# Patient Record
Sex: FEMALE | Race: BLACK OR AFRICAN AMERICAN | Employment: OTHER | URBAN - METROPOLITAN AREA
[De-identification: names, ages, dates, MRNs, and addresses within clinical notes are randomized per-mention and may not be internally consistent; named-entity substitution may affect disease eponyms.]

---

## 2017-08-25 ENCOUNTER — HOSPITAL ENCOUNTER (OUTPATIENT)
Age: 82
Setting detail: OUTPATIENT SURGERY
Discharge: HOME | End: 2017-08-25
Attending: INTERNAL MEDICINE | Admitting: INTERNAL MEDICINE
Payer: MEDICARE

## 2017-08-25 ENCOUNTER — ANESTHESIA (OUTPATIENT)
Dept: ENDOSCOPY | Age: 82
End: 2017-08-25
Payer: MEDICARE

## 2017-08-25 ENCOUNTER — ANESTHESIA EVENT (OUTPATIENT)
Dept: ENDOSCOPY | Age: 82
End: 2017-08-25
Payer: MEDICARE

## 2017-08-25 VITALS
RESPIRATION RATE: 18 BRPM | HEART RATE: 57 BPM | HEIGHT: 62 IN | OXYGEN SATURATION: 99 % | SYSTOLIC BLOOD PRESSURE: 137 MMHG | TEMPERATURE: 98.6 F | WEIGHT: 130.07 LBS | BODY MASS INDEX: 23.94 KG/M2 | DIASTOLIC BLOOD PRESSURE: 66 MMHG

## 2017-08-25 PROCEDURE — 76060000031 HC ANESTHESIA FIRST 0.5 HR: Performed by: INTERNAL MEDICINE

## 2017-08-25 PROCEDURE — 74011250636 HC RX REV CODE- 250/636

## 2017-08-25 PROCEDURE — 74011000250 HC RX REV CODE- 250

## 2017-08-25 PROCEDURE — 74011250636 HC RX REV CODE- 250/636: Performed by: INTERNAL MEDICINE

## 2017-08-25 PROCEDURE — 76040000025: Performed by: INTERNAL MEDICINE

## 2017-08-25 RX ORDER — SODIUM CHLORIDE 0.9 % (FLUSH) 0.9 %
5-10 SYRINGE (ML) INJECTION AS NEEDED
Status: CANCELLED | OUTPATIENT
Start: 2017-08-25

## 2017-08-25 RX ORDER — LIDOCAINE HYDROCHLORIDE 20 MG/ML
INJECTION, SOLUTION EPIDURAL; INFILTRATION; INTRACAUDAL; PERINEURAL AS NEEDED
Status: DISCONTINUED | OUTPATIENT
Start: 2017-08-25 | End: 2017-08-25 | Stop reason: HOSPADM

## 2017-08-25 RX ORDER — SODIUM CHLORIDE 9 MG/ML
1000 INJECTION, SOLUTION INTRAVENOUS CONTINUOUS
Status: DISCONTINUED | OUTPATIENT
Start: 2017-08-25 | End: 2017-08-25 | Stop reason: HOSPADM

## 2017-08-25 RX ORDER — PROPOFOL 10 MG/ML
INJECTION, EMULSION INTRAVENOUS AS NEEDED
Status: DISCONTINUED | OUTPATIENT
Start: 2017-08-25 | End: 2017-08-25 | Stop reason: HOSPADM

## 2017-08-25 RX ORDER — SODIUM CHLORIDE, SODIUM LACTATE, POTASSIUM CHLORIDE, CALCIUM CHLORIDE 600; 310; 30; 20 MG/100ML; MG/100ML; MG/100ML; MG/100ML
100 INJECTION, SOLUTION INTRAVENOUS CONTINUOUS
Status: DISCONTINUED | OUTPATIENT
Start: 2017-08-25 | End: 2017-08-25 | Stop reason: HOSPADM

## 2017-08-25 RX ORDER — SODIUM CHLORIDE, SODIUM LACTATE, POTASSIUM CHLORIDE, CALCIUM CHLORIDE 600; 310; 30; 20 MG/100ML; MG/100ML; MG/100ML; MG/100ML
100 INJECTION, SOLUTION INTRAVENOUS CONTINUOUS
Status: CANCELLED | OUTPATIENT
Start: 2017-08-25

## 2017-08-25 RX ADMIN — PROPOFOL 40 MG: 10 INJECTION, EMULSION INTRAVENOUS at 11:06

## 2017-08-25 RX ADMIN — PROPOFOL 20 MG: 10 INJECTION, EMULSION INTRAVENOUS at 11:09

## 2017-08-25 RX ADMIN — LIDOCAINE HYDROCHLORIDE 30 MG: 20 INJECTION, SOLUTION EPIDURAL; INFILTRATION; INTRACAUDAL; PERINEURAL at 11:06

## 2017-08-25 RX ADMIN — PROPOFOL 20 MG: 10 INJECTION, EMULSION INTRAVENOUS at 11:08

## 2017-08-25 RX ADMIN — SODIUM CHLORIDE 1000 ML: 900 INJECTION, SOLUTION INTRAVENOUS at 10:11

## 2017-08-25 RX ADMIN — PROPOFOL 20 MG: 10 INJECTION, EMULSION INTRAVENOUS at 11:13

## 2017-08-25 NOTE — ANESTHESIA POSTPROCEDURE EVALUATION
Post-Anesthesia Evaluation and Assessment    Patient: Brandon Herring MRN: [id number]  SSN: [id number]    YOB: 1929  Age: 80 y.o. Sex: female       Cardiovascular Function/Vital Signs  Visit Vitals    /66    Pulse (!) 57    Temp 37 °C (98.6 °F)    Resp 18    Ht 5' 2\" (1.575 m)    Wt 59 kg (130 lb 1.1 oz)    SpO2 99%    BMI 23.79 kg/m2       Patient is status post total IV anesthesia anesthesia for Procedure(s):  ESOPHAGOGASTRODUODENOSCOPY (EGD)  BMI 24. Nausea/Vomiting: None    Postoperative hydration reviewed and adequate. Pain:  Pain Scale 1: Numeric (0 - 10) (08/25/17 1151)  Pain Intensity 1: 0 (08/25/17 1151)   Managed    Neurological Status: At baseline    Mental Status and Level of Consciousness: Awake and at baseline    Pulmonary Status:   O2 Device: Room air (08/25/17 1151)   Adequate oxygenation and airway patent    Complications related to anesthesia: None    Post-anesthesia assessment completed.  No concerns    Signed By: Brian Mcdowell MD     August 25, 2017

## 2017-08-25 NOTE — ROUTINE PROCESS
Pt. Discharged to car by Malissa Soto with family . Vital signs stable. Able to tolerate PO fluids. Passing gas.  Seen by MD.

## 2017-08-25 NOTE — ANESTHESIA PREPROCEDURE EVALUATION
Anesthetic History   No history of anesthetic complications            Review of Systems / Medical History  Patient summary reviewed and pertinent labs reviewed    Pulmonary  Within defined limits                 Neuro/Psych   Within defined limits           Cardiovascular                  Exercise tolerance: <4 METS     GI/Hepatic/Renal     GERD    Renal disease: ESRD and dialysis  Hiatal hernia     Endo/Other        Anemia     Other Findings              Physical Exam    Airway  Mallampati: I  TM Distance: 4 - 6 cm  Neck ROM: normal range of motion   Mouth opening: Normal     Cardiovascular    Rhythm: regular  Rate: normal    Murmur: Grade 2, Mitral area     Dental    Dentition: Lower partial plate and Full upper dentures     Pulmonary  Breath sounds clear to auscultation               Abdominal         Other Findings            Anesthetic Plan    ASA: 3  Anesthesia type: total IV anesthesia          Induction: Intravenous  Anesthetic plan and risks discussed with: Patient and Son / Daughter

## 2017-08-25 NOTE — BRIEF OP NOTE
BRIEF OPERATIVE NOTE    Date of Procedure: 8/25/2017   Preoperative Diagnosis: Melena [K92.1]  AVM (arteriovenous malformation) of small bowel, acquired ([facility] [address] [K55.8]  Postoperative Diagnosis: Normal EGD     Procedure(s):  ENTEROSCOPY  BMI 24  Surgeon(s) and Role:     * Roxy Street MD - Primary         Assistant Staff:       Surgical Staff:  Endoscopy RN-1: Faye Ryder RN  Respiratory Therapist: Esha Rutledge RT  No case tracking events are documented in the log.   Anesthesia: MAC   Estimated Blood Loss: NONE  Specimens: * No specimens in log *   Findings: NORMAL ENTEROSCOPY TO PROXIMAL JEJUNUM   Complications: no immediate  Implants: * No implants in log *

## 2017-08-25 NOTE — IP AVS SNAPSHOT
[address] 
[phone] Patient: Nikia Alfaro MRN: [id number] YMY:[birth year] You are allergic to the following Allergen Reactions Aspirin Other (comments) Pt denies Recent Documentation Height Weight BMI OB Status Smoking Status 1.575 m 59 kg 23.79 kg/m2 Hysterectomy Current Every Day Smoker Emergency Contacts Name Discharge Info Relation Home Work Mobile Karyle Rakers  Other Relative [6] [phone]  Other Relative [6] [phone] About your hospitalization You were admitted on:  August 25, 2017 You last received care in the:  [facility] You were discharged on:  August 25, 2017 Unit phone number:  [phone] Why you were hospitalized Your primary diagnosis was:  Not on File Providers Seen During Your Hospitalizations Provider Role Specialty Primary office phone Orlando Wall MD Attending Provider Gastroenterology [phone] Your Primary Care Physician (PCP) Primary Care Physician Office Phone Office Fax Gerardo Harriet [phone] Follow-up Information None Current Discharge Medication List  
  
ASK your doctor about these medications Dose & Instructions Dispensing Information Comments Morning Noon Evening Bedtime DIALYVITE 800 0.8 mg Tab Generic drug:  b complex-c-zn-folic acid Your last dose was: Your next dose is:    
   
   
 Dose:  800 mg Take 800 mg by mouth every morning. Refills:  0  
     
   
   
   
  
 doxepin 10 mg capsule Commonly known as:  SINEquan Your last dose was: Your next dose is: Take  by mouth nightly. Refills:  0  
     
   
   
   
  
 pantoprazole 40 mg tablet Commonly known as:  PROTONIX Your last dose was: Your next dose is:    
   
   
 Dose:  40 mg Take 1 Tab by mouth two (2) times a day. Quantity:  60 Tab Refills:  5  
     
   
   
   
  
 pravastatin 80 mg tablet Commonly known as:  PRAVACHOL Your last dose was: Your next dose is:    
   
   
 Dose:  80 mg Take 80 mg by mouth nightly. Indications: HYPERCHOLESTEROLEMIA Refills:  0  
     
   
   
   
  
 traMADol 50 mg tablet Commonly known as:  ULTRAM  
   
Your last dose was: Your next dose is:    
   
   
 Dose:  50 mg Take 50 mg by mouth every eight (8) hours as needed for Pain. Refills:  0 Discharge Instructions Gastrointestinal Esophagogastroduodenoscopy (EGD) - Upper Exam Discharge Instructions 1. Call Dr. Yoli Dumont at [phone] for any problems or questions. 2. Contact the doctor's office for follow up appointment as directed. 3. Medication may cause drowsiness for several hours, therefore, do not drive or operate machinery for remainder of the day. 4. No alcohol today. 5. Ordinarily, you may resume regular diet and activity after exam unless otherwise specified by your physician. 6. For mild soreness in your throat you may use Cepacol throat lozenges or warm salt-water gargles as needed. Any additional instructions: Follow up as needed Discharge Orders None ACO Transitions of Care Introducing [employer] offers a voluntary care coordination program to provide high quality service and care to [facility] fee-for-service beneficiaries. Cassidy Ying was designed to help you enhance your health and well-being through the following services: ? Transitions of Care  support for individuals who are transitioning from one care setting to another (example: Hospital to home). ?  Chronic and Complex Care Coordination  support for individuals and caregivers of those with serious or chronic illnesses or with more than one chronic (ongoing) condition and those who take a number of different medications. If you meet specific medical criteria, a [facility] [address] may call you directly to coordinate your care with your primary care physician and your other care providers. For questions about the [facility]) programs, please, contact your physicians office. For general questions or additional information about Accountable Care Organizations: 
Please visit www.medicare.gov/acos. html or call 1-800-MEDICARE (8-820.827.9063) TTY users should call 3-812.970.7119. Introducing [facility]! Alea Glover introduces [facility] patient portal. Now you can access parts of your medical record, email your doctor's office, and request medication refills online. 1. In your internet browser, go to [email or web address] 2. Click on the First Time User? Click Here link in the Sign In box. You will see the New Member Sign Up page. 3. Enter your [employer] Access Code exactly as it appears below. You will not need to use this code after youve completed the sign-up process. If you do not sign up before the expiration date, you must request a new code. · [employer] Access Code: [facility] Expires: 11/15/2017 10:02 AM 
 
4. Enter the last four digits of your Social Security Number (xxxx) and Date of Birth (mm/dd/yyyy) as indicated and click Submit. You will be taken to the next sign-up page. 5. Create a [employer] ID. This will be your [facility] login ID and cannot be changed, so think of one that is secure and easy to remember. 6. Create a [employer] password. You can change your password at any time. 7. Enter your Password Reset Question and Answer. This can be used at a later time if you forget your password. 8. Enter your e-mail address.  You will receive e-mail notification when new information is available in [employer]. 9. Click Sign Up. You can now view and download portions of your medical record. 10. Click the Download Summary menu link to download a portable copy of your medical information. If you have questions, please visit the Frequently Asked Questions section of the [employer] website. Remember, [employer] is NOT to be used for urgent needs. For medical emergencies, dial 911. Now available from your iPhone and Android! General Information Please provide this summary of care documentation to your next provider. Patient Signature:  ____________________________________________________________ Date:  ____________________________________________________________  
  
Rafael Jose Juan Provider Signature:  ____________________________________________________________ Date:  ____________________________________________________________

## 2017-08-25 NOTE — PROCEDURES
[address]   PROCEDURE NOTE       Name:  Yudelka Aceves   MR#:  [id number]   :  1929   Account #:  [de-identified]   Date of Adm:  2017       DATE OF PROCEDURE: 2017     REFERRING: Wolf Carlos. PROCEDURE: Enteroscopy. INDICATIONS: Gastrointestinal bleed, history of arteriovenous   malformations. POSTOPERATIVE DIAGNOSIS: Normal examination to the proximal   jejunum. ANESTHESIA: MAC.      OTHER PROCEDURES: None. SCOPE: PCF-H190L. PROCEDURE NOTE: After informed consent was obtained, the patient   was placed in the left lateral decubitus position in the   endoscopy suite. Conscious sedation was obtained utilizing   monitored anesthesia. Once adequate analgesia was obtained,   digital rectal exam is performed. The Olympus PCF-H190L video   chip pediatric adjustable colonoscope was advanced through the   oropharynx into the esophagus, stomach and duodenum. The   endoscope was advanced to the area of the ligament of Treitz and   using push enteroscopy technique, was advanced into the proximal   jejunum to full insertion of the endoscope. The endoscope was   withdrawn with careful attention to mucosal detail. I appreciate   no active bleeding in the small bowel. I appreciate no   arteriovenous malformations in the examined jejunum and duodenum   or stomach, including retroflexed examination. The esophagus was   normal as well. ASSESSMENT AND PLAN: History of arteriovenous malformations and   melena, likely related to GI bleeding out of the reach the   enteroscope. Serial hemoglobins, transfuse.         MD RAISSA Queen / Tyrone Jamison   D:  2017   11:28   T:  2017   11:50   Job #:  [id number]

## 2017-08-25 NOTE — H&P
[facility] H&P (Admission)        Date:  8/25/2017    Primary GI Physician:  Dennis    Chief Complaint:  Anemia    Subjective:     History of Present Illness:  Patient is a 80 y.o. female with PMH of ESRD/ HD, who is being admitted for elective endoscopy for anemia. She has a previous history of AVMs. PMH:  Past Medical History:   Diagnosis Date    Anemia     GI    AVM (arteriovenous malformation)     Chronic kidney disease     dialysis T,R,S    Diverticulosis 2012    GERD (gastroesophageal reflux disease)     Hiatal hernia     found on EGD on 06/26/2014    High cholesterol        PSH:  Past Surgical History:   Procedure Laterality Date    HX GYN      uterus removed-pt thinks?  HX ORTHOPAEDIC      right hip replacement , right little bone removed-pt not sure       Allergies: Allergies   Allergen Reactions    Aspirin Other (comments)     Pt denies       Home Medications:  Prior to Admission medications    Medication Sig Start Date End Date Taking? Authorizing Provider   pantoprazole (PROTONIX) 40 mg tablet Take 1 Tab by mouth two (2) times a day. 10/1/16  Yes Deedee Hemphill MD   doxepin (SINEQUAN) 10 mg capsule Take  by mouth nightly. Yes Historical Provider   pravastatin (PRAVACHOL) 80 mg tablet Take 80 mg by mouth nightly. Indications: HYPERCHOLESTEROLEMIA   Yes Historical Provider   b complex-c-zn-folic acid (DIALYVITE 534) 0.8 mg tab Take 800 mg by mouth every morning. Yes Historical Provider   traMADol (ULTRAM) 50 mg tablet Take 50 mg by mouth every eight (8) hours as needed for Pain.     Historical Provider       Hospital Medications:  Current Facility-Administered Medications   Medication Dose Route Frequency    lactated Ringers infusion  100 mL/hr IntraVENous CONTINUOUS    0.9% sodium chloride infusion 1,000 mL  1,000 mL IntraVENous CONTINUOUS       Social History:  Social History   Substance Use Topics    Smoking status: Current Every Day Smoker     Packs/day: 0.25 Years: 75.00    Smokeless tobacco: Never Used    Alcohol use No       Pt denies any history of drug use, tattoos, or blood transfusions. Family History:  History reviewed. No pertinent family history. Review of Systems:  A detailed 10 system ROS is obtained, with pertinent positives as listed above. All others are negative. Objective:     Physical Exam:  Vitals:  Visit Vitals    /65    Pulse 65    Temp 98.3 °F (36.8 °C)    Resp 18    Ht 5' 2\" (1.575 m)    Wt 59 kg (130 lb 1.1 oz)    SpO2 98%    BMI 23.79 kg/m2     Gen:  Pt is alert, cooperative, no acute distress  Skin:  Extremities and face reveal no rashes. HEENT: Sclerae anicteric. Extra-occular muscles are intact. No oral ulcers. No abnormal pigmentation of the lips. The neck is supple. Cardiovascular: Regular rate and rhythm. No murmurs, gallops, or rubs. Respiratory:  Comfortable breathing with no accessory muscle use. Clear breath sounds with no wheezes, rales, or rhonchi. GI:  Abdomen nondistended, soft, and nontender. Normal active bowel sounds. No enlargement of the liver or spleen. No masses palpable. Rectal:  Deferred  Musculoskeletal:  No pitting edema of the lower legs. Neurological:  Gross memory appears intact. Patient is alert and oriented. Psychiatric:  Mood appears appropriate with judgement intact. Lymphatic:  No cervical or supraclavicular adenopathy. Laboratory:    No results for input(s): WBC, HGB, HCT, PLT, MCV, NA, K, CL, CO2, BUN, CREA, CA, MG, GLU, AP, SGOT, GPT, ALT, TBIL, TBILI, CBIL, ALB, TP, AML, LPSE, PTP, INR, APTT, HGBEXT, HCTEXT, PLTEXT in the last 72 hours. No lab exists for component: DBIL, INREXT    Assessment:       Active Problems:    * No active hospital problems.  *      Plan:       Iron deficiency Anemia-  Plan for EGD/ Enteroscopy to eval PUD/ AVMs

## 2017-08-25 NOTE — DISCHARGE INSTRUCTIONS
Gastrointestinal Esophagogastroduodenoscopy (EGD) - Upper Exam Discharge Instructions    1. Call Dr. Tyler Bonilla at [phone] for any problems or questions. 2. Contact the doctor's office for follow up appointment as directed. 3. Medication may cause drowsiness for several hours, therefore, do not drive or operate machinery for remainder of the day. 4. No alcohol today. 5. Ordinarily, you may resume regular diet and activity after exam unless otherwise specified by your physician. 6. For mild soreness in your throat you may use Cepacol throat lozenges or warm salt-water gargles as needed. Any additional instructions:   Follow up as needed

## (undated) DEVICE — BLOCK BITE AD 60FR W/ VELC STRP ADDRESSES MOST PT AND

## (undated) DEVICE — CONNECTOR TBNG OD5-7MM O2 END DISP

## (undated) DEVICE — CANNULA NSL ORAL AD FOR CAPNOFLEX CO2 O2 AIRLFE

## (undated) DEVICE — KENDALL RADIOLUCENT FOAM MONITORING ELECTRODE RECTANGULAR SHAPE: Brand: KENDALL